# Patient Record
Sex: MALE
[De-identification: names, ages, dates, MRNs, and addresses within clinical notes are randomized per-mention and may not be internally consistent; named-entity substitution may affect disease eponyms.]

---

## 2019-06-18 ENCOUNTER — HOSPITAL ENCOUNTER (EMERGENCY)
Dept: HOSPITAL 97 - ER | Age: 24
Discharge: HOME | End: 2019-06-18
Payer: COMMERCIAL

## 2019-06-18 DIAGNOSIS — S01.112A: Primary | ICD-10-CM

## 2019-06-18 DIAGNOSIS — Z23: ICD-10-CM
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
## 2019-06-18 DIAGNOSIS — S00.93XA: ICD-10-CM
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
## 2019-06-18 PROCEDURE — 76377 3D RENDER W/INTRP POSTPROCES: CPT

## 2019-06-18 PROCEDURE — 08QPXZZ REPAIR LEFT UPPER EYELID, EXTERNAL APPROACH: ICD-10-PCS

## 2019-06-18 PROCEDURE — 99284 EMERGENCY DEPT VISIT MOD MDM: CPT

## 2019-06-18 PROCEDURE — 72125 CT NECK SPINE W/O DYE: CPT

## 2019-06-18 PROCEDURE — 90714 TD VACC NO PRESV 7 YRS+ IM: CPT

## 2019-06-18 PROCEDURE — 90471 IMMUNIZATION ADMIN: CPT

## 2019-06-18 PROCEDURE — 70486 CT MAXILLOFACIAL W/O DYE: CPT

## 2019-06-18 PROCEDURE — 70450 CT HEAD/BRAIN W/O DYE: CPT
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
